# Patient Record
Sex: MALE
[De-identification: names, ages, dates, MRNs, and addresses within clinical notes are randomized per-mention and may not be internally consistent; named-entity substitution may affect disease eponyms.]

---

## 2019-10-02 ENCOUNTER — HOSPITAL ENCOUNTER (EMERGENCY)
Dept: HOSPITAL 25 - UCEAST | Age: 74
Discharge: HOME | End: 2019-10-02
Payer: COMMERCIAL

## 2019-10-02 VITALS — DIASTOLIC BLOOD PRESSURE: 77 MMHG | SYSTOLIC BLOOD PRESSURE: 129 MMHG

## 2019-10-02 DIAGNOSIS — R05: ICD-10-CM

## 2019-10-02 DIAGNOSIS — I10: ICD-10-CM

## 2019-10-02 DIAGNOSIS — E78.5: ICD-10-CM

## 2019-10-02 DIAGNOSIS — Z79.899: ICD-10-CM

## 2019-10-02 DIAGNOSIS — H92.09: ICD-10-CM

## 2019-10-02 DIAGNOSIS — Z95.1: ICD-10-CM

## 2019-10-02 DIAGNOSIS — J02.9: ICD-10-CM

## 2019-10-02 DIAGNOSIS — J32.9: Primary | ICD-10-CM

## 2019-10-02 DIAGNOSIS — F17.210: ICD-10-CM

## 2019-10-02 PROCEDURE — G0463 HOSPITAL OUTPT CLINIC VISIT: HCPCS

## 2019-10-02 PROCEDURE — 87651 STREP A DNA AMP PROBE: CPT

## 2019-10-02 PROCEDURE — 99212 OFFICE O/P EST SF 10 MIN: CPT

## 2019-10-02 NOTE — UC
Respiratory Complaint HPI





- HPI Summary


HPI Summary: 





Patient presents to urgent care with his wife.  Patient's 74-year-old male for 

7 days has had progressive sore throat sinus congestion postnasal drip.  

Patient also with a cough that keeps him awake at night.  Cough productive of 

yellow sputum.  No fevers or chills.  No nausea or vomiting.  Decreased 

appetite.  Patient does smoke 3-4 cigarettes a day.  Patient does not have a 

diagnosis of COPD.  Patient has taken over-the-counter cortisone once without 

relief.  No allergies or antipyretic today.  Patient's medications review this 

visit.  Patient's wife also being evaluated today for similar symptoms





- History of Current Complaint


Chief Complaint: UCRespiratory


Stated Complaint: COUGH


Time Seen by Provider: 10/02/19 12:05


Hx Obtained From: Patient, Family/Caretaker


Severity Currently: None


Pain Intensity: 0


Pain Scale Used: 0-10 Numeric


Character: Cough: Productive


Alleviating Factors: OTC Meds





- Allergies/Home Medications


Allergies/Adverse Reactions: 


 Allergies











Allergy/AdvReac Type Severity Reaction Status Date / Time


 


No Known Allergies Allergy   Verified 10/02/19 12:02











Home Medications: 


 Home Medications





Atenolol TAB* [Tenormin TAB* 50 MG] 1 tab PO DAILY 10/02/19 [History Confirmed 

10/02/19]


Atorvastatin* [Lipitor 40 MG*] 1 tab PO DAILY 10/02/19 [History Confirmed 10/02/

19]











PMH/Surg Hx/FS Hx/Imm Hx


Previously Healthy: Yes


Endocrine History: Dyslipidemia


Cardiovascular History: Hypertension





- Surgical History


Surgical History: Yes


Surgery Procedure, Year, and Place: caria by pass surgery





- Family History


Known Family History: Positive: Non-Contributory





- Social History


Occupation: Retired


Lives: With Family


Alcohol Use: Occasionally


Substance Use Type: None


Smoking Status (MU): Current Every Day Smoker





Review of Systems


All Other Systems Reviewed And Are Negative: Yes


Constitutional: Positive: Negative


Skin: Positive: Negative


Eyes: Positive: Negative


ENT: Positive: Ear Ache, Sinus Pain/Tenderness


Respiratory: Positive: Cough - is such


Is Patient Immunocompromised?: No





Physical Exam





- Summary


Physical Exam Summary: 





Vital Signs Reviewed: Yes


A+Ox3, no distress


Eyes: Conjunctiva Clear, ERNIE. EOM intact and full


ENT: Hearing grossly normal  TM x 2 clear, fluid right TM, + turbinates 

inflammed and boggy, + thick PND, mmoist, uvula midline, no exudate, + erythema


Neck: Positive: Supple


Respiratory: Positive: No respiratory distress, No accessory muscle use + CTA 

throughout  no w/r,no cough during eval


Cardiovascular: RRR  nl s1, s2  no m/r  CBT <2  sec


abd soft + BS nt/nd  no guarding, no distension


Musculoskeletal Exam: WHITMAN x 4 without difficulty Strength Intact, ROM Intact


Neurological: Positive: Alert,  + sensation throughout


Psychological: Positive: Normal Response To examiner


Skin: Positive: no rash, no ecchymosis


Triage Information Reviewed: Yes


Vital Signs: 


 Initial Vital Signs











Temp  97.8 F   10/02/19 11:58


 


Pulse  71   10/02/19 11:58


 


Resp  16   10/02/19 11:58


 


BP  129/77   10/02/19 11:58


 


Pulse Ox  100   10/02/19 11:58














Re-Evaluation





- Re-Evaluation


  ** First Eval


Comment: strep neg.  reviewed with pt





Respiratory Course/Dx





- Course


Course Of Treatment: 





Patient presents to urgent care where 7 days progressive sinus congestion and 

ear pain sore throat postnasal drip and cough.  Patient took over-the-counter 

medication once with little improvement.  Patient's wife has similar symptoms.  

On exam vital signs are stable.  Patient has fluid in her right ear.  Patient 

with sinus congestion postnasal drip.  Patient without a cough.  


Vital signs are stable.  Patient exam consistent with sinus infection.  Patient 

does have erythema of his throat soreness she doesn't have strep.  Anticipate 

Flonase, antibiotics, and antihistamine.  Secretion precaution.  Return 

precaution.  Patient comfortable with plan.





- Differential Dx/Diagnosis


Provider Diagnosis: 


 Rhinosinusitis








Discharge ED





- Sign-Out/Discharge


Documenting (check all that apply): Patient Departure


All imaging exams completed and their final reports reviewed: No Studies





- Discharge Plan


Condition: Stable


Disposition: HOME


Prescriptions: 


Amoxicillin PO (*) [Amoxicillin 500 MG CAP*] 500 mg PO Q12H #14 cap


Fluticasone NASAL SPRAY 50MCG* [Flonase NASAL SPRAY 50MCG*] 2 spray BOTH NARES 

DAILY #1 btl


Patient Education Materials:  Rhinosinusitis (ED)


Referrals: 


Cyn Brush DO [Primary Care Provider] - 


Additional Instructions: 


- Stay well hydrated. Drink plenty of non-alcoholic, non-caffinated beverages.


- Alternate ibuprofen (Advil, Motrin) 600mg and Tylenol every 3 hours for pain 

or fever.  Take with food. Do NOT take for more than 4-5 days. 


- These infections are spread by secretions - do NOT share eating or drinking 

utensils - clean items you share with other people such as cell phones, 

computer mouse, TV remote, computer tablets,etc. Once you have been antibiotics 

for 2 days,  change your toothbrush and your pillowcase.


- get plenty of restful sleep


- humidify the air in the room where you sleep - boil water, run a hot steam 

shower, vaporizer, cups of water by heat register


- It is recommended you take Claritin, Allegra, or Zyrtec to help with 

congestion


- use nasal spray as prescribed


- Take antibiotics as prescribed until gone


- contact your doctor or return with questions or concerns





- Billing Disposition and Condition


Condition: STABLE


Disposition: Home